# Patient Record
Sex: MALE | Race: WHITE | Employment: FULL TIME | URBAN - METROPOLITAN AREA
[De-identification: names, ages, dates, MRNs, and addresses within clinical notes are randomized per-mention and may not be internally consistent; named-entity substitution may affect disease eponyms.]

---

## 2020-08-10 ENCOUNTER — HOSPITAL ENCOUNTER (EMERGENCY)
Age: 55
Discharge: HOME | End: 2020-08-10
Attending: EMERGENCY MEDICINE
Payer: COMMERCIAL

## 2020-08-10 ENCOUNTER — APPOINTMENT (OUTPATIENT)
Dept: GENERAL RADIOLOGY | Age: 55
End: 2020-08-10
Payer: COMMERCIAL

## 2020-08-10 VITALS
OXYGEN SATURATION: 97 % | BODY MASS INDEX: 29.02 KG/M2 | HEART RATE: 81 BPM | TEMPERATURE: 99 F | HEIGHT: 72 IN | WEIGHT: 214.29 LBS | RESPIRATION RATE: 15 BRPM | DIASTOLIC BLOOD PRESSURE: 80 MMHG | SYSTOLIC BLOOD PRESSURE: 121 MMHG

## 2020-08-10 DIAGNOSIS — R53.81 MALAISE AND FATIGUE: ICD-10-CM

## 2020-08-10 DIAGNOSIS — R63.0 DECREASED APPETITE: ICD-10-CM

## 2020-08-10 DIAGNOSIS — R53.83 MALAISE AND FATIGUE: ICD-10-CM

## 2020-08-10 DIAGNOSIS — R50.9 FEVER, UNSPECIFIED FEVER CAUSE: Primary | ICD-10-CM

## 2020-08-10 LAB
ALBUMIN SERPL-MCNC: 3.6 G/DL (ref 3.5–5)
ALBUMIN/GLOB SERPL: 0.7 {RATIO} (ref 1.1–2.2)
ALP SERPL-CCNC: 65 U/L (ref 45–117)
ALT SERPL-CCNC: 55 U/L (ref 12–78)
ANION GAP SERPL CALC-SCNC: 4 MMOL/L (ref 5–15)
APPEARANCE UR: CLEAR
AST SERPL-CCNC: 27 U/L (ref 15–37)
BACTERIA URNS QL MICRO: NEGATIVE /HPF
BASOPHILS # BLD: 0 K/UL (ref 0–0.1)
BASOPHILS NFR BLD: 0 % (ref 0–1)
BILIRUB SERPL-MCNC: 0.8 MG/DL (ref 0.2–1)
BILIRUB UR QL: NEGATIVE
BUN SERPL-MCNC: 13 MG/DL (ref 6–20)
BUN/CREAT SERPL: 11 (ref 12–20)
CALCIUM SERPL-MCNC: 9.3 MG/DL (ref 8.5–10.1)
CHLORIDE SERPL-SCNC: 103 MMOL/L (ref 97–108)
CO2 SERPL-SCNC: 28 MMOL/L (ref 21–32)
COLOR UR: ABNORMAL
CREAT SERPL-MCNC: 1.22 MG/DL (ref 0.7–1.3)
DIFFERENTIAL METHOD BLD: ABNORMAL
EOSINOPHIL # BLD: 0 K/UL (ref 0–0.4)
EOSINOPHIL NFR BLD: 0 % (ref 0–7)
EPITH CASTS URNS QL MICRO: ABNORMAL /LPF
ERYTHROCYTE [DISTWIDTH] IN BLOOD BY AUTOMATED COUNT: 13.5 % (ref 11.5–14.5)
GLOBULIN SER CALC-MCNC: 5.1 G/DL (ref 2–4)
GLUCOSE SERPL-MCNC: 109 MG/DL (ref 65–100)
GLUCOSE UR STRIP.AUTO-MCNC: NEGATIVE MG/DL
HCT VFR BLD AUTO: 44.3 % (ref 36.6–50.3)
HGB BLD-MCNC: 13.9 G/DL (ref 12.1–17)
HGB UR QL STRIP: NEGATIVE
HYALINE CASTS URNS QL MICRO: ABNORMAL /LPF (ref 0–5)
IMM GRANULOCYTES # BLD AUTO: 0.1 K/UL (ref 0–0.04)
IMM GRANULOCYTES NFR BLD AUTO: 1 % (ref 0–0.5)
KETONES UR QL STRIP.AUTO: NEGATIVE MG/DL
LEUKOCYTE ESTERASE UR QL STRIP.AUTO: NEGATIVE
LYMPHOCYTES # BLD: 1.2 K/UL (ref 0.8–3.5)
LYMPHOCYTES NFR BLD: 12 % (ref 12–49)
MCH RBC QN AUTO: 26.3 PG (ref 26–34)
MCHC RBC AUTO-ENTMCNC: 31.4 G/DL (ref 30–36.5)
MCV RBC AUTO: 83.7 FL (ref 80–99)
MONOCYTES # BLD: 1.5 K/UL (ref 0–1)
MONOCYTES NFR BLD: 15 % (ref 5–13)
NEUTS SEG # BLD: 7.2 K/UL (ref 1.8–8)
NEUTS SEG NFR BLD: 72 % (ref 32–75)
NITRITE UR QL STRIP.AUTO: NEGATIVE
NRBC # BLD: 0 K/UL (ref 0–0.01)
NRBC BLD-RTO: 0 PER 100 WBC
PH UR STRIP: 6 [PH] (ref 5–8)
PLATELET # BLD AUTO: 312 K/UL (ref 150–400)
PMV BLD AUTO: 9.2 FL (ref 8.9–12.9)
POTASSIUM SERPL-SCNC: 4.3 MMOL/L (ref 3.5–5.1)
PROT SERPL-MCNC: 8.7 G/DL (ref 6.4–8.2)
PROT UR STRIP-MCNC: 30 MG/DL
RBC # BLD AUTO: 5.29 M/UL (ref 4.1–5.7)
RBC #/AREA URNS HPF: ABNORMAL /HPF (ref 0–5)
SODIUM SERPL-SCNC: 135 MMOL/L (ref 136–145)
SP GR UR REFRACTOMETRY: 1.02 (ref 1–1.03)
T4 FREE SERPL-MCNC: 1.3 NG/DL (ref 0.8–1.5)
TSH SERPL DL<=0.05 MIU/L-ACNC: 1.09 UIU/ML (ref 0.36–3.74)
UA: UC IF INDICATED,UAUC: ABNORMAL
UROBILINOGEN UR QL STRIP.AUTO: 0.2 EU/DL (ref 0.2–1)
WBC # BLD AUTO: 10 K/UL (ref 4.1–11.1)
WBC URNS QL MICRO: ABNORMAL /HPF (ref 0–4)

## 2020-08-10 PROCEDURE — 36415 COLL VENOUS BLD VENIPUNCTURE: CPT

## 2020-08-10 PROCEDURE — 84439 ASSAY OF FREE THYROXINE: CPT

## 2020-08-10 PROCEDURE — 96361 HYDRATE IV INFUSION ADD-ON: CPT

## 2020-08-10 PROCEDURE — 86757 RICKETTSIA ANTIBODY: CPT

## 2020-08-10 PROCEDURE — 99285 EMERGENCY DEPT VISIT HI MDM: CPT

## 2020-08-10 PROCEDURE — 81001 URINALYSIS AUTO W/SCOPE: CPT

## 2020-08-10 PROCEDURE — 71045 X-RAY EXAM CHEST 1 VIEW: CPT

## 2020-08-10 PROCEDURE — 84480 ASSAY TRIIODOTHYRONINE (T3): CPT

## 2020-08-10 PROCEDURE — 87635 SARS-COV-2 COVID-19 AMP PRB: CPT

## 2020-08-10 PROCEDURE — 85025 COMPLETE CBC W/AUTO DIFF WBC: CPT

## 2020-08-10 PROCEDURE — 84443 ASSAY THYROID STIM HORMONE: CPT

## 2020-08-10 PROCEDURE — 96374 THER/PROPH/DIAG INJ IV PUSH: CPT

## 2020-08-10 PROCEDURE — 80053 COMPREHEN METABOLIC PANEL: CPT

## 2020-08-10 PROCEDURE — 86618 LYME DISEASE ANTIBODY: CPT

## 2020-08-10 PROCEDURE — 74011250636 HC RX REV CODE- 250/636: Performed by: PHYSICIAN ASSISTANT

## 2020-08-10 RX ORDER — DOXYCYCLINE HYCLATE 100 MG
100 TABLET ORAL 2 TIMES DAILY
Qty: 10 TAB | Refills: 0 | Status: SHIPPED | OUTPATIENT
Start: 2020-08-10 | End: 2020-08-20

## 2020-08-10 RX ORDER — ONDANSETRON 4 MG/1
4 TABLET, ORALLY DISINTEGRATING ORAL
Qty: 15 TAB | Refills: 0 | Status: SHIPPED | OUTPATIENT
Start: 2020-08-10 | End: 2020-08-20

## 2020-08-10 RX ORDER — ONDANSETRON 2 MG/ML
4 INJECTION INTRAMUSCULAR; INTRAVENOUS
Status: COMPLETED | OUTPATIENT
Start: 2020-08-10 | End: 2020-08-10

## 2020-08-10 RX ADMIN — SODIUM CHLORIDE 1000 ML: 9 INJECTION, SOLUTION INTRAVENOUS at 12:57

## 2020-08-10 RX ADMIN — ONDANSETRON 4 MG: 2 INJECTION INTRAMUSCULAR; INTRAVENOUS at 12:57

## 2020-08-10 NOTE — ED PROVIDER NOTES
EMERGENCY DEPARTMENT HISTORY AND PHYSICAL EXAM      Date: 8/10/2020  Patient Name: Gisselle Loya    History of Presenting Illness     Chief Complaint   Patient presents with    Fever     Fever for a week on and off.  Decreased Appetite     Doesn't feel like eating. Still has taste though.  Neck Pain     2 to 3 days of neck pain. History Provided By: Patient and patient's wife    HPI: Gisselle Loya, 63 y.o. male presents to the Emergency Dept with his wife in attendance, c/o 1 week h/o fever/chills, malaise/fatigue and decreased appetite. Pt states last weekend he went to a cabin with a friend and \"got bad sunburn on Saturday and the next day I noticed chills\". The patient states Sunday, in spite of this he went for a very lengthy hike. They went home a little early Sunday and he noted Monday morning he was \"drenched in sweat\". He reports the fever/sweats/chills have persisted throughout the week. He followed up with his PCP at [facility] who felt the patient had a viral illness and encouraged use of Tylenol/Motrin but to return if he did not improve. The patient denied ill contacts. No other recent travel. He denied congestion, cough, shortness of breath or chest pain. He denied N/V/D but hasn't had any appetite and denied much po intake. He denied dysuria/hematuria. He reports normal urine output. He denied headache. His wife reports the patient has had neck pain for the last several days. He denied injury and reports he has not been sleeping well. No rash/lesion. He denied known insect sting/bite. He returned to his PCP today and was referred to the ED for further evaluation. His wife added Serrano paola said to make sure to check his thyroid\".         Chief Complaint: fevers, decreased appetite, malaise and fatigue since 8/2  Duration:7 Days  Timing:  Acute  Location: diffuse  Quality: Aching  Severity: Moderate  Modifying Factors: no ill contacts, no vomiting, had Tylenol at 0800  Associated Symptoms: denies any other associated signs or symptoms        There are no other complaints, changes, or physical findings at this time. PCP: Grace Baeza NP    Current Outpatient Medications   Medication Sig Dispense Refill    ondansetron (Zofran ODT) 4 mg disintegrating tablet Take 1 Tab by mouth every eight (8) hours as needed for Nausea or Vomiting for up to 15 doses. 15 Tab 0    doxycycline (VIBRA-TABS) 100 mg tablet Take 1 Tab by mouth two (2) times a day for 10 days. 10 Tab 0       Past History     Past Medical History:  History reviewed. No pertinent past medical history. Past Surgical History:  History reviewed. No pertinent surgical history. Family History:  History reviewed. No pertinent family history. Social History:  Social History     Tobacco Use    Smoking status: Not on file   Substance Use Topics    Alcohol use: Not on file    Drug use: Not on file       Allergies: Allergies   Allergen Reactions    Lidocaine Other (comments)     Passes out    Other Medication Nausea Only     Antibiotics make him nauseated - can't remember which ones         Review of Systems   Review of Systems   Constitutional: Positive for appetite change, chills, fatigue and fever. HENT: Negative for congestion, postnasal drip, rhinorrhea and sore throat. Eyes: Negative for photophobia and visual disturbance. Respiratory: Negative for cough and shortness of breath. Cardiovascular: Negative for chest pain and palpitations. Gastrointestinal: Negative for abdominal pain, constipation, diarrhea, nausea and vomiting. Endocrine: Negative for polydipsia, polyphagia and polyuria. Genitourinary: Negative for decreased urine volume, difficulty urinating, dysuria and hematuria. Musculoskeletal: Negative for myalgias, neck pain and neck stiffness. Skin: Negative for pallor, rash and wound.    Allergic/Immunologic: Negative for food allergies and immunocompromised state.   Neurological: Negative for dizziness and headaches. Hematological: Negative for adenopathy. Does not bruise/bleed easily. Psychiatric/Behavioral: Negative for agitation and confusion. All other systems reviewed and are negative. Physical Exam   Physical Exam  Vitals signs and nursing note reviewed. Constitutional:       General: He is not in acute distress. Appearance: Normal appearance. He is well-developed and normal weight. He is ill-appearing. He is not toxic-appearing or diaphoretic. HENT:      Head: Normocephalic and atraumatic. Right Ear: Tympanic membrane, ear canal and external ear normal. There is no impacted cerumen. Left Ear: Tympanic membrane, ear canal and external ear normal. There is no impacted cerumen. Nose: Nose normal. No congestion or rhinorrhea. Mouth/Throat:      Mouth: Mucous membranes are dry. Pharynx: No oropharyngeal exudate. Eyes:      General: No scleral icterus. Right eye: No discharge. Left eye: No discharge. Extraocular Movements: Extraocular movements intact. Conjunctiva/sclera: Conjunctivae normal.      Pupils: Pupils are equal, round, and reactive to light. Neck:      Musculoskeletal: Normal range of motion and neck supple. Muscular tenderness present. No neck rigidity. Thyroid: No thyromegaly. Vascular: No JVD. Trachea: No tracheal deviation. Comments: Mild paracervical tenderness, no midline cervical spinal tenderness, full A/P ROM without tenderness  Cardiovascular:      Rate and Rhythm: Normal rate and regular rhythm. Pulses: Normal pulses. Heart sounds: Normal heart sounds. Pulmonary:      Effort: Pulmonary effort is normal. No respiratory distress. Breath sounds: Normal breath sounds. No wheezing. Abdominal:      General: Abdomen is flat. Palpations: Abdomen is soft. Tenderness: There is no abdominal tenderness.  There is no right CVA tenderness, left CVA tenderness, guarding or rebound. Musculoskeletal: Normal range of motion. General: No tenderness. Right lower leg: No edema. Left lower leg: No edema. Lymphadenopathy:      Cervical: No cervical adenopathy. Skin:     General: Skin is warm and dry. Coloration: Skin is not pale. Findings: No rash. Neurological:      General: No focal deficit present. Mental Status: He is alert and oriented to person, place, and time. Sensory: No sensory deficit. Motor: No weakness or abnormal muscle tone. Coordination: Coordination normal.      Gait: Gait normal.   Psychiatric:         Mood and Affect: Mood normal.         Behavior: Behavior normal.         Judgment: Judgment normal.         Diagnostic Study Results     Labs -     Recent Results (from the past 12 hour(s))   CBC WITH AUTOMATED DIFF    Collection Time: 08/10/20 11:36 AM   Result Value Ref Range    WBC 10.0 4.1 - 11.1 K/uL    RBC 5.29 4. 10 - 5.70 M/uL    HGB 13.9 12.1 - 17.0 g/dL    HCT 44.3 36.6 - 50.3 %    MCV 83.7 80.0 - 99.0 FL    MCH 26.3 26.0 - 34.0 PG    MCHC 31.4 30.0 - 36.5 g/dL    RDW 13.5 11.5 - 14.5 %    PLATELET 220 625 - 827 K/uL    MPV 9.2 8.9 - 12.9 FL    NRBC 0.0 0  WBC    ABSOLUTE NRBC 0.00 0.00 - 0.01 K/uL    NEUTROPHILS 72 32 - 75 %    LYMPHOCYTES 12 12 - 49 %    MONOCYTES 15 (H) 5 - 13 %    EOSINOPHILS 0 0 - 7 %    BASOPHILS 0 0 - 1 %    IMMATURE GRANULOCYTES 1 (H) 0.0 - 0.5 %    ABS. NEUTROPHILS 7.2 1.8 - 8.0 K/UL    ABS. LYMPHOCYTES 1.2 0.8 - 3.5 K/UL    ABS. MONOCYTES 1.5 (H) 0.0 - 1.0 K/UL    ABS. EOSINOPHILS 0.0 0.0 - 0.4 K/UL    ABS. BASOPHILS 0.0 0.0 - 0.1 K/UL    ABS. IMM.  GRANS. 0.1 (H) 0.00 - 0.04 K/UL    DF AUTOMATED     METABOLIC PANEL, COMPREHENSIVE    Collection Time: 08/10/20 11:36 AM   Result Value Ref Range    Sodium 135 (L) 136 - 145 mmol/L    Potassium 4.3 3.5 - 5.1 mmol/L    Chloride 103 97 - 108 mmol/L    CO2 28 21 - 32 mmol/L    Anion gap 4 (L) 5 - 15 mmol/L Glucose 109 (H) 65 - 100 mg/dL    BUN 13 6 - 20 MG/DL    Creatinine 1.22 0.70 - 1.30 MG/DL    BUN/Creatinine ratio 11 (L) 12 - 20      GFR est AA >60 >60 ml/min/1.73m2    GFR est non-AA >60 >60 ml/min/1.73m2    Calcium 9.3 8.5 - 10.1 MG/DL    Bilirubin, total 0.8 0.2 - 1.0 MG/DL    ALT (SGPT) 55 12 - 78 U/L    AST (SGOT) 27 15 - 37 U/L    Alk. phosphatase 65 45 - 117 U/L    Protein, total 8.7 (H) 6.4 - 8.2 g/dL    Albumin 3.6 3.5 - 5.0 g/dL    Globulin 5.1 (H) 2.0 - 4.0 g/dL    A-G Ratio 0.7 (L) 1.1 - 2.2     URINALYSIS W/ REFLEX CULTURE    Collection Time: 08/10/20  1:43 PM    Specimen: Urine   Result Value Ref Range    Color YELLOW/STRAW      Appearance CLEAR CLEAR      Specific gravity 1.018 1.003 - 1.030      pH (UA) 6.0 5.0 - 8.0      Protein 30 (A) NEG mg/dL    Glucose Negative NEG mg/dL    Ketone Negative NEG mg/dL    Bilirubin Negative NEG      Blood Negative NEG      Urobilinogen 0.2 0.2 - 1.0 EU/dL    Nitrites Negative NEG      Leukocyte Esterase Negative NEG      WBC 0-4 0 - 4 /hpf    RBC 0-5 0 - 5 /hpf    Epithelial cells FEW FEW /lpf    Bacteria Negative NEG /hpf    UA:UC IF INDICATED CULTURE NOT INDICATED BY UA RESULT CNI      Hyaline cast 0-2 0 - 5 /lpf   TSH 3RD GENERATION    Collection Time: 08/10/20  1:43 PM   Result Value Ref Range    TSH 1.09 0.36 - 3.74 uIU/mL   T4, FREE    Collection Time: 08/10/20  1:43 PM   Result Value Ref Range    T4, Free 1.3 0.8 - 1.5 NG/DL   SARS-COV-2    Collection Time: 08/10/20  2:57 PM   Result Value Ref Range    Specimen source Nasopharyngeal      Specimen source Nasopharyngeal      Specimen type NP Swab      Health status Symptomatic Testing      COVID-19 PENDING        Radiologic Studies -   XR CHEST SNGL V   Final Result    impression: Negative. CXR Results  (Last 48 hours)               08/10/20 1232  XR CHEST SNGL V Final result    Impression:   impression: Negative. Narrative:  Clinical indication: Fever.        Portable AP upright view of the chest obtained, no prior. The  heart size is normal. There is no acute infiltrate. Medical Decision Making   I am the first provider for this patient. I reviewed the vital signs, available nursing notes, past medical history, past surgical history, family history and social history. Vital Signs-Reviewed the patient's vital signs. Patient Vitals for the past 12 hrs:   Temp Pulse Resp BP SpO2   08/10/20 1500 99 °F (37.2 °C) 81 15 121/80 97 %   08/10/20 1400  73 27 117/71 98 %   08/10/20 1300  81 25 130/75 97 %   08/10/20 1230  80 25 124/76 98 %   08/10/20 1125 99.1 °F (37.3 °C) 86 18 131/81 99 %         Records Reviewed: Nursing Notes, Old Medical Records, Previous Radiology Studies and Previous Laboratory Studies    Provider Notes (Medical Decision Making):   Viral illness, electrolyte dysfunction, dehydration, UTI, PNA, thyroid d/o, tick bite, COVID    ED Course:   Initial assessment performed. The patients presenting problems have been discussed, and they are in agreement with the care plan formulated and outlined with them. I have encouraged them to ask questions as they arise throughout their visit. Case d/w Dr. Iglesias who recommended addition of Lyme Titer and Yash Mtn Spotted Fever as well as discharge home with Doxycycline. The evaluation, management, and disposition decisions of this patient have been made in the context of the current and rapidly developing COVID-19 pandemic. In my clinical judgment, the balance of clinical factors dictate expedited evaluation and discharge from the ED. I have carefully considered the risk and benefits of prolonged ED workups and/or hospitalization vs their risk of acquiring or transmitting COVID-19. I have made reasonable efforts to conserve healthcare resources and defer to safe outpatient alternatives when feasible. I have also discussed the importance of social distancing and proper hygiene to the patient.   Based on an appropriate medical screening exam, there is currently no evidence of an emergency medical condition in the patient, and he is clinically safe for discharge. This was a collective decision made with the patient and/or any available family/caretakers. They expressed understanding and agreement with the above. Monette Goldberg, [place]       PLAN:  1. Discharge Medication List as of 8/10/2020  2:35 PM        2. Follow-up Information     Follow up With Specialties Details Why Contact Info    Chetan Galvin, MARTIR Nurse Practitioner   [address] [place] [address]  [phone]      \[facility] [place] [facility]\"" EMERGENCY DEPT Emergency Medicine  If symptoms worsen [address] [place] [address] Drive  [address] [facility]  [phone]        Return to ED if worse     Diagnosis     Clinical Impression:   1. Fever, unspecified fever cause    2. Decreased appetite    3.  Malaise and fatigue

## 2020-08-10 NOTE — DISCHARGE INSTRUCTIONS
Rest, push fluids, alternate Tylenol and Motrin for fever, and follow up with PCP for recheck. Return to the emergency department for any worsening fever, cough, chest pain, shortness of breath, decreased oral intake, or decreased urine output. Patient Education   Learning About Coronavirus [phone])  Coronavirus [phone]): Overview  What is coronavirus (EVGSN-54)? The coronavirus disease (COVID-19) is caused by a virus. It is an illness that was first found in [place], [place], in December 2019. It has since spread worldwide. The virus can cause fever, cough, and trouble breathing. In severe cases, it can cause pneumonia and make it hard to breathe without help. It can cause death. Coronaviruses are a large group of viruses. They cause the common cold. They also cause more serious illnesses like Middle East respiratory syndrome (MERS) and severe acute respiratory syndrome (SARS). COVID-19 is caused by a novel coronavirus. That means it's a new type that has not been seen in people before. This virus spreads person-to-person through droplets from coughing and sneezing. It can also spread when you are close to someone who is infected. And it can spread when you touch something that has the virus on it, such as a doorknob or a tabletop. What can you do to protect yourself from coronavirus (COVID-19)? The best way to protect yourself from getting sick is to:  · Avoid areas where there is an outbreak. · Avoid contact with people who may be infected. · Wash your hands often with soap or alcohol-based hand sanitizers. · Avoid crowds and try to stay at least 6 feet away from other people. · Wash your hands often, especially after you cough or sneeze. Use soap and water, and scrub for at least 20 seconds. If soap and water aren't available, use an alcohol-based hand . · Avoid touching your mouth, nose, and eyes. What can you do to avoid spreading the virus to others?   To help avoid spreading the virus to others:  · Cover your mouth with a tissue when you cough or sneeze. Then throw the tissue in the trash. · Use a disinfectant to clean things that you touch often. · Stay home if you are sick or have been exposed to the virus. Don't go to school, work, or public areas. And don't use public transportation. · If you are sick:  ? Leave your home only if you need to get medical care. But call the doctor's office first so they know you're coming. And wear a face mask, if you have one.  ? If you have a face mask, wear it whenever you're around other people. It can help stop the spread of the virus when you cough or sneeze. ? Clean and disinfect your home every day. Use household  and disinfectant wipes or sprays. Take special care to clean things that you grab with your hands. These include doorknobs, remote controls, phones, and handles on your refrigerator and microwave. And don't forget countertops, tabletops, bathrooms, and computer keyboards. When to call for help  Call 911 anytime you think you may need emergency care. For example, call if:  · You have severe trouble breathing. (You can't talk at all.)  · You have constant chest pain or pressure. · You are severely dizzy or lightheaded. · You are confused or can't think clearly. · Your face and lips have a blue color. · You pass out (lose consciousness) or are very hard to wake up. Call your doctor now if you develop symptoms such as:  · Shortness of breath. · Fever. · Cough. If you need to get care, call ahead to the doctor's office for instructions before you go. Make sure you wear a face mask, if you have one, to prevent exposing other people to the virus. Where can you get the latest information? The following health organizations are tracking and studying this virus. Their websites contain the most up-to-date information. Carmel Cedillo also learn what to do if you think you may have been exposed to the virus. · [place]  Centers for Disease Control and Prevention (CDC): The CDC provides updated news about the disease and travel advice. The website also tells you how to prevent the spread of infection. www.cdc.gov  · World Health Organization [facility]): WHO offers information about the virus outbreaks. WHO also has travel advice. www.who.int  Current as of: April 1, 2020               Content Version: 12.4  © 1482-5585 Healthwise, Incorporated. Care instructions adapted under license by your healthcare professional. If you have questions about a medical condition or this instruction, always ask your healthcare professional. [address] any warranty or liability for your use of this information.

## 2020-08-10 NOTE — LETTER
[address] 
[facility] EMERGENCY DEPT 
[address] [facility] [place] [id number] [phone] Work/School Note Date: 8/10/2020 To Whom It May concern: 
 
 
Jaret Ramsey was seen and treated today in the emergency room. In light of the current COVID-19 pandemic, please excuse your employee from work under the circumstance below: 
 
1) If patient was exposed but without symptoms, he/she should self-isolate at home for 14 days from day of exposure. 2) If patient has symptoms concerning for COVID-19, such as fever, cough, shortness of breath, regardless if patient received testing or not, patient should self-isolate at home until 3 days after symptoms have resolved AND 7 days after symptoms first started, whichever is later. Thank you. Sincerely, Teodoro Bo

## 2020-08-10 NOTE — ED NOTES
Discharge instructions provided to patient and family. Verbalized understanding. Alert and oriented. IV lock removed. Offered w/c but pt declined. Ambulated with steady gait out of ED.

## 2020-08-11 ENCOUNTER — PATIENT OUTREACH (OUTPATIENT)
Dept: CASE MANAGEMENT | Age: 55
End: 2020-08-11

## 2020-08-11 LAB — R RICKETTSI IGM SER-ACNC: 0.22 INDEX (ref 0–0.89)

## 2020-08-11 NOTE — PROGRESS NOTES
Patient contacted regarding COVID-19  risk. Discussed COVID-19 related testing which was available at this time. Test results were pending. Patient informed of results, if available? Patient informed test result are pending     Care Transition Nurse/ Ambulatory Care Manager/ LPN Care Coordinator contacted the patient by telephone to perform post discharge assessment. Verified name and  with patient as identifiers. Provided introduction to self, and explanation of the CTN/ACM/LPN role, and reason for call due to risk factors for infection and/or exposure to COVID-19. Symptoms reviewed with patient who verbalized the following symptoms: no worsening symptoms. Due to no new or worsening symptoms encounter was not routed to provider for escalation. Discussed follow-up appointments. If no appointment was previously scheduled, appointment scheduling offered: no awaiting test result  [address] follow up appointment(s): No future appointments. Non-[facility] follow up appointment(s): n/a      Advance Care Planning:   Does patient have an Advance Directive: not on file     Patient has following risk factors of: no known risk factors. CTN/ACM/LPN reviewed discharge instructions, medical action plan and red flags such as increased shortness of breath, increasing fever and signs of decompensation with patient who verbalized understanding. Discussed exposure protocols and quarantine with CDC Guidelines What to do if you are sick with coronavirus disease .  Patient was given an opportunity for questions and concerns. The patient agrees to contact the [facility] exposure line [phone], [facility] [address]  ([phone]) and PCP office for questions related to their healthcare. CTN/ACM provided contact information for future needs. Reviewed and educated patient on any new and changed medications related to discharge diagnosis.     Patient/family/caregiver given information for "[employer] Loop and agrees to enroll yes  Patient's preferred [email or web address]  Patient's preferred phone number: [phone]  Based on Loop alert triggers, patient will be contacted by nurse care manager for worsening symptoms. Pt will be further monitored by COVID Loop Team based on severity of symptoms and risk factors.

## 2020-08-12 LAB
B BURGDOR IGG+IGM SER-ACNC: <0.91 ISR (ref 0–0.9)
T3 SERPL-MCNC: 83 NG/DL (ref 71–180)

## 2020-08-13 LAB
COVID-19, XGCOVT: NOT DETECTED
HEALTH STATUS, XMCV2T: NORMAL
SOURCE, COVRS: NORMAL
SPECIMEN SOURCE, FCOV2M: NORMAL
SPECIMEN TYPE, XMCV1T: NORMAL

## 2020-08-20 ENCOUNTER — HOSPITAL ENCOUNTER (EMERGENCY)
Age: 55
Discharge: HOME | End: 2020-08-20
Attending: EMERGENCY MEDICINE
Payer: COMMERCIAL

## 2020-08-20 ENCOUNTER — APPOINTMENT (OUTPATIENT)
Dept: GENERAL RADIOLOGY | Age: 55
End: 2020-08-20
Attending: EMERGENCY MEDICINE
Payer: COMMERCIAL

## 2020-08-20 VITALS
WEIGHT: 208.78 LBS | HEART RATE: 80 BPM | HEIGHT: 73 IN | RESPIRATION RATE: 16 BRPM | DIASTOLIC BLOOD PRESSURE: 77 MMHG | SYSTOLIC BLOOD PRESSURE: 119 MMHG | OXYGEN SATURATION: 100 % | BODY MASS INDEX: 27.67 KG/M2 | TEMPERATURE: 98.3 F

## 2020-08-20 DIAGNOSIS — R50.9 FEVER, UNSPECIFIED FEVER CAUSE: Primary | ICD-10-CM

## 2020-08-20 DIAGNOSIS — R53.83 FATIGUE, UNSPECIFIED TYPE: ICD-10-CM

## 2020-08-20 DIAGNOSIS — R07.89 ATYPICAL CHEST PAIN: ICD-10-CM

## 2020-08-20 LAB
ALBUMIN SERPL-MCNC: 3.4 G/DL (ref 3.5–5)
ALBUMIN/GLOB SERPL: 0.6 {RATIO} (ref 1.1–2.2)
ALP SERPL-CCNC: 77 U/L (ref 45–117)
ALT SERPL-CCNC: 63 U/L (ref 12–78)
ANION GAP SERPL CALC-SCNC: 6 MMOL/L (ref 5–15)
APPEARANCE UR: CLEAR
AST SERPL-CCNC: 20 U/L (ref 15–37)
BACTERIA URNS QL MICRO: NEGATIVE /HPF
BASOPHILS # BLD: 0 K/UL (ref 0–0.1)
BASOPHILS NFR BLD: 0 % (ref 0–1)
BILIRUB SERPL-MCNC: 0.5 MG/DL (ref 0.2–1)
BILIRUB UR QL: NEGATIVE
BNP SERPL-MCNC: 145 PG/ML
BUN SERPL-MCNC: 18 MG/DL (ref 6–20)
BUN/CREAT SERPL: 15 (ref 12–20)
CALCIUM SERPL-MCNC: 9.8 MG/DL (ref 8.5–10.1)
CHLORIDE SERPL-SCNC: 105 MMOL/L (ref 97–108)
CK SERPL-CCNC: 51 U/L (ref 39–308)
CO2 SERPL-SCNC: 25 MMOL/L (ref 21–32)
COLOR UR: NORMAL
CREAT SERPL-MCNC: 1.17 MG/DL (ref 0.7–1.3)
DIFFERENTIAL METHOD BLD: ABNORMAL
EOSINOPHIL # BLD: 0.1 K/UL (ref 0–0.4)
EOSINOPHIL NFR BLD: 1 % (ref 0–7)
EPITH CASTS URNS QL MICRO: NORMAL /LPF
ERYTHROCYTE [DISTWIDTH] IN BLOOD BY AUTOMATED COUNT: 13.2 % (ref 11.5–14.5)
GLOBULIN SER CALC-MCNC: 5.3 G/DL (ref 2–4)
GLUCOSE SERPL-MCNC: 102 MG/DL (ref 65–100)
GLUCOSE UR STRIP.AUTO-MCNC: NEGATIVE MG/DL
HCT VFR BLD AUTO: 39.6 % (ref 36.6–50.3)
HGB BLD-MCNC: 12.5 G/DL (ref 12.1–17)
HGB UR QL STRIP: NEGATIVE
HYALINE CASTS URNS QL MICRO: NORMAL /LPF (ref 0–5)
IMM GRANULOCYTES # BLD AUTO: 0.1 K/UL (ref 0–0.04)
IMM GRANULOCYTES NFR BLD AUTO: 1 % (ref 0–0.5)
KETONES UR QL STRIP.AUTO: NEGATIVE MG/DL
LACTATE BLD-SCNC: 0.58 MMOL/L (ref 0.4–2)
LEUKOCYTE ESTERASE UR QL STRIP.AUTO: NEGATIVE
LIPASE SERPL-CCNC: 294 U/L (ref 73–393)
LYMPHOCYTES # BLD: 1.4 K/UL (ref 0.8–3.5)
LYMPHOCYTES NFR BLD: 13 % (ref 12–49)
MAGNESIUM SERPL-MCNC: 2.4 MG/DL (ref 1.6–2.4)
MCH RBC QN AUTO: 26.3 PG (ref 26–34)
MCHC RBC AUTO-ENTMCNC: 31.6 G/DL (ref 30–36.5)
MCV RBC AUTO: 83.2 FL (ref 80–99)
MONOCYTES # BLD: 1.1 K/UL (ref 0–1)
MONOCYTES NFR BLD: 11 % (ref 5–13)
NEUTS SEG # BLD: 8 K/UL (ref 1.8–8)
NEUTS SEG NFR BLD: 74 % (ref 32–75)
NITRITE UR QL STRIP.AUTO: NEGATIVE
NRBC # BLD: 0 K/UL (ref 0–0.01)
NRBC BLD-RTO: 0 PER 100 WBC
PH UR STRIP: 5.5 [PH] (ref 5–8)
PLATELET # BLD AUTO: 520 K/UL (ref 150–400)
PMV BLD AUTO: 8.7 FL (ref 8.9–12.9)
POTASSIUM SERPL-SCNC: 4.7 MMOL/L (ref 3.5–5.1)
PROT SERPL-MCNC: 8.7 G/DL (ref 6.4–8.2)
PROT UR STRIP-MCNC: NEGATIVE MG/DL
RBC # BLD AUTO: 4.76 M/UL (ref 4.1–5.7)
RBC #/AREA URNS HPF: NORMAL /HPF (ref 0–5)
SODIUM SERPL-SCNC: 136 MMOL/L (ref 136–145)
SP GR UR REFRACTOMETRY: 1.02 (ref 1–1.03)
TROPONIN I SERPL-MCNC: <0.05 NG/ML
UA: UC IF INDICATED,UAUC: NORMAL
UROBILINOGEN UR QL STRIP.AUTO: 0.2 EU/DL (ref 0.2–1)
WBC # BLD AUTO: 10.7 K/UL (ref 4.1–11.1)
WBC URNS QL MICRO: NORMAL /HPF (ref 0–4)

## 2020-08-20 PROCEDURE — 83690 ASSAY OF LIPASE: CPT

## 2020-08-20 PROCEDURE — 83880 ASSAY OF NATRIURETIC PEPTIDE: CPT

## 2020-08-20 PROCEDURE — 84484 ASSAY OF TROPONIN QUANT: CPT

## 2020-08-20 PROCEDURE — 96374 THER/PROPH/DIAG INJ IV PUSH: CPT

## 2020-08-20 PROCEDURE — 83735 ASSAY OF MAGNESIUM: CPT

## 2020-08-20 PROCEDURE — 80053 COMPREHEN METABOLIC PANEL: CPT

## 2020-08-20 PROCEDURE — 86788 WEST NILE VIRUS AB IGM: CPT

## 2020-08-20 PROCEDURE — 74011000250 HC RX REV CODE- 250: Performed by: EMERGENCY MEDICINE

## 2020-08-20 PROCEDURE — 36415 COLL VENOUS BLD VENIPUNCTURE: CPT

## 2020-08-20 PROCEDURE — 85025 COMPLETE CBC W/AUTO DIFF WBC: CPT

## 2020-08-20 PROCEDURE — 74011250636 HC RX REV CODE- 250/636: Performed by: EMERGENCY MEDICINE

## 2020-08-20 PROCEDURE — 81001 URINALYSIS AUTO W/SCOPE: CPT

## 2020-08-20 PROCEDURE — 99284 EMERGENCY DEPT VISIT MOD MDM: CPT

## 2020-08-20 PROCEDURE — 83605 ASSAY OF LACTIC ACID: CPT

## 2020-08-20 PROCEDURE — 71045 X-RAY EXAM CHEST 1 VIEW: CPT

## 2020-08-20 PROCEDURE — 87040 BLOOD CULTURE FOR BACTERIA: CPT

## 2020-08-20 PROCEDURE — 82550 ASSAY OF CK (CPK): CPT

## 2020-08-20 PROCEDURE — 93005 ELECTROCARDIOGRAM TRACING: CPT

## 2020-08-20 RX ADMIN — FAMOTIDINE 20 MG: 10 INJECTION, SOLUTION INTRAVENOUS at 13:51

## 2020-08-20 RX ADMIN — SODIUM CHLORIDE 1000 ML: 900 INJECTION, SOLUTION INTRAVENOUS at 13:51

## 2020-08-20 NOTE — ED NOTES
MD at the bedside evaluating PT     Pt reports on going fevers for the past month with worsening symptoms at night. Pt reports chest discomfort and denies SOB. Pt reported stop taking tylenol because it did not help relive his symptoms. 1420 Pt resting on stretcher in POC with call bell in reach and family at bedside. Pt remains on monitor x  2 VSS at this time.

## 2020-08-20 NOTE — DISCHARGE INSTRUCTIONS
Patient Education        Learning About Fever  What is a fever? A fever is a high body temperature. It's one way your body fights being sick. A fever shows that the body is responding to infection or other illnesses, both minor and severe. A fever is a symptom, not an illness by itself. A fever can be a sign that you are ill, but most fevers are not caused by a serious problem. You may have a fever with a minor illness, such as a cold. But sometimes a very serious infection may cause little or no fever. It is important to look at other symptoms, other conditions you have, and how you feel in general. In children, notice how they act and see what symptoms they complain of. What is a normal body temperature? A normal body temperature is about 98. 6ºF. Some people have a normal temperature that is a little higher or a little lower than this. Your temperature may be a little lower in the morning than it is later in the day. It may go up during hot weather or when you exercise, wear heavy clothes, or take a hot bath. Your temperature may also be different depending on how you take it. A temperature taken in the mouth (oral) or under the arm may be a little lower than your core temperature (rectal). What is a fever temperature? A core temperature of 100.4°F or above is considered a fever. What can cause a fever? A fever may be caused by:  · Infections. This is the most common cause of a fever. Examples of infections that can cause a fever include the flu, a kidney infection, or pneumonia. · Some medicines. · Severe trauma or injury, such as a heart attack, stroke, heatstroke, or burns. · Other medical conditions, such as arthritis and some cancers. How can you treat a fever at home? · Ask your doctor if you can take an over-the-counter pain medicine, such as acetaminophen (Tylenol), ibuprofen (Advil, Motrin), or naproxen (Aleve). Be safe with medicines.  Read and follow all instructions on the label.  · To prevent dehydration, drink plenty of fluids. Choose water and other caffeine-free clear liquids until you feel better. If you have kidney, heart, or liver disease and have to limit fluids, talk with your doctor before you increase the amount of fluids you drink. Follow-up care is a key part of your treatment and safety. Be sure to make and go to all appointments, and call your doctor if you are having problems. It's also a good idea to know your test results and keep a list of the medicines you take. Where can you learn more? Go to http://annalise-zelalem.info/  Enter G732 in the search box to learn more about \"Learning About Fever. \"  Current as of: June 26, 2019               Content Version: 12.5  © 0670-7117 Healthwise, Incorporated. Care instructions adapted under license by [employer] (which disclaims liability or warranty for this information). If you have questions about a medical condition or this instruction, always ask your healthcare professional. [address] any warranty or liability for your use of this information.

## 2020-08-20 NOTE — ED PROVIDER NOTES
EMERGENCY DEPARTMENT HISTORY AND PHYSICAL EXAM      Date: 8/20/2020  Patient Name: Raquel Kraft    History of Presenting Illness     Chief Complaint   Patient presents with    Fever     pt has had fever since 08/02. These fevers spike at night. His temperature yesterday was normal all day until nighttime. previous viral work up was negative    Fatigue    Chest Pain     pt has midsternal chest pressure and burping since this started. History Provided By: Patient, Patient's Wife and Prior ER visit    HPI: Raquel Kraft, 63 y.o. male presents to the ED with cc of fever, fatigue and chest discomfort. Patient symptoms started on August 2. [place] and had [place]. Since then, he has had fevers of up to 103.5. The fevers have started to diminish, however he had a temperature of 102 last night. He has not been taking Tylenol recently. He was seen here in the emergency department on August 10. At that time, labs were unremarkable, including CBC thyroid panel, Lyme, R Rickettsia I, and SARS-CoV-2. He has a chest pressure which feels like a burping and is worse when he eats. That is located in the midsternal region. Does not radiate to the neck, back, jaw or arms. He has generalized fatigue. He denies headache, neck pain, dysuria, diarrhea, abdominal pain. There are no other complaints, changes, or physical findings at this time. PCP: Juana Carbajal NP    No current facility-administered medications on file prior to encounter. Current Outpatient Medications on File Prior to Encounter   Medication Sig Dispense Refill    [DISCONTINUED] ondansetron (Zofran ODT) 4 mg disintegrating tablet Take 1 Tab by mouth every eight (8) hours as needed for Nausea or Vomiting for up to 15 doses. 15 Tab 0    [DISCONTINUED] doxycycline (VIBRA-TABS) 100 mg tablet Take 1 Tab by mouth two (2) times a day for 10 days.  10 Tab 0       Past History     Past Medical History:  History reviewed. No pertinent past medical history. Past Surgical History:  History reviewed. No pertinent surgical history. Family History:  History reviewed. No pertinent family history. Social History:  Social History     Tobacco Use    Smoking status: Never Smoker    Smokeless tobacco: Never Used   Substance Use Topics    Alcohol use: Yes     Alcohol/week: 1.0 standard drinks     Types: 1 Cans of beer per week     Comment: on occasion     Drug use: Never       Allergies: Allergies   Allergen Reactions    Lidocaine Other (comments)     Passes out    Other Medication Nausea Only     Antibiotics make him nauseated - can't remember which ones         Review of Systems   Review of Systems   Constitutional: Positive for fatigue and fever. HENT: Negative for congestion. Eyes: Negative. Respiratory: Negative for cough and shortness of breath. Cardiovascular: Positive for chest pain. Gastrointestinal: Negative for abdominal pain. Endocrine: Negative for heat intolerance. Genitourinary: Negative for dysuria. Musculoskeletal: Negative for back pain. Skin: Negative for rash. Allergic/Immunologic: Negative for immunocompromised state. Neurological: Negative for headaches. Hematological: Does not bruise/bleed easily. Psychiatric/Behavioral: Negative. All other systems reviewed and are negative. Physical Exam   Physical Exam  Vitals signs and nursing note reviewed. Constitutional:       General: He is not in acute distress. Appearance: He is well-developed. HENT:      Head: Normocephalic and atraumatic. Neck:      Musculoskeletal: Normal range of motion and neck supple. Cardiovascular:      Rate and Rhythm: Normal rate and regular rhythm. Heart sounds: Normal heart sounds. Pulmonary:      Effort: Pulmonary effort is normal.      Breath sounds: Normal breath sounds.    Abdominal:      General: Bowel sounds are normal.      Palpations: Abdomen is soft. Musculoskeletal: Normal range of motion. Skin:     General: Skin is warm and dry. Neurological:      General: No focal deficit present. Mental Status: He is alert and oriented to person, place, and time. Coordination: Coordination normal.         Diagnostic Study Results     Labs -     Recent Results (from the past 12 hour(s))   EKG, 12 LEAD, INITIAL    Collection Time: 08/20/20 12:19 PM   Result Value Ref Range    Ventricular Rate 81 BPM    Atrial Rate 81 BPM    P-R Interval 162 ms    QRS Duration 86 ms    Q-T Interval 396 ms    QTC Calculation (Bezet) 460 ms    Calculated P Axis 27 degrees    Calculated R Axis 30 degrees    Calculated T Axis 24 degrees    Diagnosis       ** Poor data quality, interpretation may be adversely affected  Normal sinus rhythm  Normal ECG  No previous ECGs available     CBC WITH AUTOMATED DIFF    Collection Time: 08/20/20 12:40 PM   Result Value Ref Range    WBC 10.7 4.1 - 11.1 K/uL    RBC 4.76 4.10 - 5.70 M/uL    HGB 12.5 12.1 - 17.0 g/dL    HCT 39.6 36.6 - 50.3 %    MCV 83.2 80.0 - 99.0 FL    MCH 26.3 26.0 - 34.0 PG    MCHC 31.6 30.0 - 36.5 g/dL    RDW 13.2 11.5 - 14.5 %    PLATELET 991 (H) 761 - 400 K/uL    MPV 8.7 (L) 8.9 - 12.9 FL    NRBC 0.0 0  WBC    ABSOLUTE NRBC 0.00 0.00 - 0.01 K/uL    NEUTROPHILS 74 32 - 75 %    LYMPHOCYTES 13 12 - 49 %    MONOCYTES 11 5 - 13 %    EOSINOPHILS 1 0 - 7 %    BASOPHILS 0 0 - 1 %    IMMATURE GRANULOCYTES 1 (H) 0.0 - 0.5 %    ABS. NEUTROPHILS 8.0 1.8 - 8.0 K/UL    ABS. LYMPHOCYTES 1.4 0.8 - 3.5 K/UL    ABS. MONOCYTES 1.1 (H) 0.0 - 1.0 K/UL    ABS. EOSINOPHILS 0.1 0.0 - 0.4 K/UL    ABS. BASOPHILS 0.0 0.0 - 0.1 K/UL    ABS. IMM.  GRANS. 0.1 (H) 0.00 - 0.04 K/UL    DF AUTOMATED     METABOLIC PANEL, COMPREHENSIVE    Collection Time: 08/20/20 12:40 PM   Result Value Ref Range    Sodium 136 136 - 145 mmol/L    Potassium 4.7 3.5 - 5.1 mmol/L    Chloride 105 97 - 108 mmol/L    CO2 25 21 - 32 mmol/L    Anion gap 6 5 - 15 mmol/L    Glucose 102 (H) 65 - 100 mg/dL    BUN 18 6 - 20 MG/DL    Creatinine 1.17 0.70 - 1.30 MG/DL    BUN/Creatinine ratio 15 12 - 20      GFR est AA >60 >60 ml/min/1.73m2    GFR est non-AA >60 >60 ml/min/1.73m2    Calcium 9.8 8.5 - 10.1 MG/DL    Bilirubin, total 0.5 0.2 - 1.0 MG/DL    ALT (SGPT) 63 12 - 78 U/L    AST (SGOT) 20 15 - 37 U/L    Alk. phosphatase 77 45 - 117 U/L    Protein, total 8.7 (H) 6.4 - 8.2 g/dL    Albumin 3.4 (L) 3.5 - 5.0 g/dL    Globulin 5.3 (H) 2.0 - 4.0 g/dL    A-G Ratio 0.6 (L) 1.1 - 2.2     POC LACTIC ACID    Collection Time: 08/20/20  1:53 PM   Result Value Ref Range    Lactic Acid (POC) 0.58 0.40 - 2.00 mmol/L       Radiologic Studies -   XR CHEST PORT   Final Result   IMPRESSION: No acute findings. CT Results  (Last 48 hours)    None        CXR Results  (Last 48 hours)               08/20/20 1357  XR CHEST PORT Final result    Impression:  IMPRESSION: No acute findings. Narrative:  EXAM: XR CHEST PORT       INDICATION: pain       COMPARISON: 8/10/2020       FINDINGS: A portable AP radiograph of the chest was obtained at 1346 hours. There is no pneumothorax or pleural effusion. The lungs are clear. Cardiac,   mediastinal and hilar contours are normal.  The bones and soft tissues are   grossly within normal limits. Medical Decision Making   I am the first provider for this patient. I reviewed the vital signs, available nursing notes, past medical history, past surgical history, family history and social history. Vital Signs-Reviewed the patient's vital signs. Patient Vitals for the past 12 hrs:   Temp Pulse Resp BP SpO2   08/20/20 1329     100 %   08/20/20 1216 98.3 °F (36.8 °C) 80 16 119/77 100 %       EKG interpretation: (Preliminary)  Rhythm: normal sinus rhythm; and regular .  Rate (approx.): 81; Axis: normal; VT interval: normal; QRS interval: normal ; ST/T wave: Normal; Other findings: normal.    Records Reviewed: Nursing Notes, Old Medical Records, Previous electrocardiograms, Previous Radiology Studies and Previous Laboratory Studies    Provider Notes (Medical Decision Making):   Viral syndrome, CAD, dehydration, electrolyte abnormality, West Nile, anemia,    ED Course:   Initial assessment performed. The patients presenting problems have been discussed, and they are in agreement with the care plan formulated and outlined with them. I have encouraged them to ask questions as they arise throughout their visit. Progress note: The patient is feeling better. His results were reviewed. He is advised to follow-up and return to ER if worse               Critical Care Time:   0    Disposition:  home    DISCHARGE PLAN:  1. There are no discharge medications for this patient. 2.   Follow-up Information     Follow up With Specialties Details Why Contact Info    Tri Galvin NP Nurse Practitioner  As needed [address] [place] [address]  [phone]      Lora Goodwin MD Infectious Disease, Internal Medicine Call in 1 day  [address] [address]. [phone]      \[facility] [place] [facility]\"" EMERGENCY DEPT Emergency Medicine  If symptoms worsen [address]  [address] [facility]  [phone]        3. Return to ED if worse     Diagnosis     Clinical Impression:   1. Fever, unspecified fever cause    2. Atypical chest pain    3. Fatigue, unspecified type        Attestations:    Demetri Gregg MD    Please note that this dictation was completed with [employer]ev[employer], the computer voice recognition software. Quite often unanticipated grammatical, syntax, homophones, and other interpretive errors are inadvertently transcribed by the computer software. Please disregard these errors. Please excuse any errors that have escaped final proofreading. Thank you.

## 2020-08-21 ENCOUNTER — PATIENT OUTREACH (OUTPATIENT)
Dept: CASE MANAGEMENT | Age: 55
End: 2020-08-21

## 2020-08-21 NOTE — PROGRESS NOTES
Patient contacted regarding COVID-19  risk. Discussed COVID-19 related testing which was not done at this time. Test results were not done. Patient informed of results, if available? Covid test not done on this visit, was completed on 8/10/20-negative results. Care Transition Nurse/ Ambulatory Care Manager/ LPN Care Coordinator contacted the patient by telephone to perform post discharge assessment. Verified name and  with patient as identifiers. Provided introduction to self, and explanation of the CTN/ACM/LPN role, and reason for call due to risk factors for infection and/or exposure to COVID-19. Symptoms reviewed with patient who verbalized the following symptoms: fever, fatigue, pain or aching joints, nausea and chest pain. Due to no new or worsening symptoms encounter was not routed to provider for escalation. Discussed follow-up appointments. If no appointment was previously scheduled, appointment scheduling offered: no  [facility] follow up appointment(s): No future appointments. Non-[facility] follow up appointment(s): none scheduled at this time      Advance Care Planning:   Does patient have an Advance Directive: spouse is medical agent     Patient has following risk factors of: none reported at this time. CTN/ACM/LPN reviewed discharge instructions, medical action plan and red flags such as increased shortness of breath, increasing fever and signs of decompensation with patient who verbalized understanding. Discussed exposure protocols and quarantine with CDC Guidelines What to do if you are sick with coronavirus disease .  Patient was given an opportunity for questions and concerns. The patient agrees to contact the [facility] exposure line [phone], [facility] department [facility]  ([phone]) and PCP office for questions related to their healthcare. CTN/ACM provided contact information for future needs. Patient declined 800#s-stated he already has contact numbers. Reviewed and educated patient on any new and changed medications related to discharge diagnosis. Patient/family/caregiver given information for [employer] and agrees to enroll no  Patient's preferred e-mail:  n/a  Patient's preferred phone number: [phone]  Patient already enrolled in LOOP  Based on Loop alert triggers, patient will be contacted by nurse care manager for worsening symptoms. Pt will be further monitored by COVID Loop Team based on severity of symptoms and risk factors.  DMB

## 2020-08-22 LAB
ATRIAL RATE: 81 BPM
CALCULATED P AXIS, ECG09: 27 DEGREES
CALCULATED R AXIS, ECG10: 30 DEGREES
CALCULATED T AXIS, ECG11: 24 DEGREES
DIAGNOSIS, 93000: NORMAL
P-R INTERVAL, ECG05: 162 MS
Q-T INTERVAL, ECG07: 396 MS
QRS DURATION, ECG06: 86 MS
QTC CALCULATION (BEZET), ECG08: 460 MS
VENTRICULAR RATE, ECG03: 81 BPM

## 2020-08-24 LAB
WNV IGG SER QL IA: NEGATIVE
WNV IGM SER QL IA: NEGATIVE

## 2020-08-25 LAB
BACTERIA SPEC CULT: NORMAL
SERVICE CMNT-IMP: NORMAL

## 2020-09-04 ENCOUNTER — TELEPHONE (OUTPATIENT)
Dept: INTERNAL MEDICINE CLINIC | Age: 55
End: 2020-09-04

## 2020-09-04 NOTE — TELEPHONE ENCOUNTER
----- Message from Frankjena Crooks sent at 9/4/2020  4:00 PM EDT -----  Regarding: /Telephone  General Message/Vendor Calls    Caller's first and last name:Pt       Reason for call:Np appointment      Callback required yes/no and why:Yes      Best contact number(s):[phone]      Details to clarify the request:Pt requesting a call back regarding np appointment due to fever , lack of appetite and chills. Pt stated he was tested twice for cov-19 and was negative. Pt was adamant about sending message to establish care. Pt stated he have an upcoming appointment with an infection disease dr on 09/09/2020 and need a pcp to follow up with after appointment.       Jessica Hernandez

## 2020-09-09 ENCOUNTER — OFFICE VISIT (OUTPATIENT)
Dept: INTERNAL MEDICINE CLINIC | Age: 55
End: 2020-09-09
Payer: COMMERCIAL

## 2020-09-09 VITALS
RESPIRATION RATE: 18 BRPM | SYSTOLIC BLOOD PRESSURE: 118 MMHG | HEART RATE: 98 BPM | WEIGHT: 208 LBS | TEMPERATURE: 98.1 F | HEIGHT: 73 IN | DIASTOLIC BLOOD PRESSURE: 72 MMHG | BODY MASS INDEX: 27.57 KG/M2 | OXYGEN SATURATION: 97 %

## 2020-09-09 DIAGNOSIS — R50.9 FEVER, UNSPECIFIED FEVER CAUSE: Primary | ICD-10-CM

## 2020-09-09 DIAGNOSIS — R50.9 FEVER, UNSPECIFIED FEVER CAUSE: ICD-10-CM

## 2020-09-09 PROCEDURE — 99205 OFFICE O/P NEW HI 60 MIN: CPT | Performed by: INTERNAL MEDICINE

## 2020-09-09 RX ORDER — OLMESARTAN MEDOXOMIL 20 MG/1
20 TABLET ORAL DAILY
COMMUNITY
Start: 2020-08-17 | End: 2021-02-10 | Stop reason: SDUPTHER

## 2020-09-09 NOTE — Clinical Note
Can you pls find out who reffered this patient to me and send them a copy of my note from 9/9/20  Thanks

## 2020-09-09 NOTE — PROGRESS NOTES
Infectious Disease Consult Note    Reason for Consult: fever  Date of Consultation: September 9, 2020        HPI:  Karine Alvarado is a 63y.o. year old male with history of DVT who presents with ongoing fevers since August 2020. He reports getting chills shakes and fatigue. Reports fever with a T-max of 103.7 3-4 times since August.  Says he takes Tylenol or Motrin but does not help. Reports having had neck pain prior to symptoms for which he saw a chiropractor and since then the neck pain is resolved. Admits to head rush without any pain. Admits to being constipated. Admits to having dyspnea on exertion had some feet pain. He denies any myalgias or rash. He says his appetite has gotten better in the last 3 to 4 weeks but he has had some weight loss. Reports he used to be 230 pounds before his symptoms started and is 208 pounds today. He denies any genital discharge or lumps or bumps. He says that the fevers are mostly mostly at nights. He cannot think of any aggravating or alleviating factors. Denies any chest pain or visual symptoms, sore throat or ulcers in his mouth or genital area. He denies any nausea vomiting or dysuria. Denies any back pain or focal joint pain. Denies any neurological symptoms other than just feeling a \" head rush\" when he gets up too soon. He denies any sick contacts    In regards to epidemiological history, he has been exposed to chickens and has cleaned coup. He used to have goats 3 to 4 years ago but has not come in contact with goats sheep or other farm animals recently. He has 3 dogs that are vaccinated. Used to go to a "[employer]in and swim [place]. Also reports hiking with these events have been after his fever started. The only symptom his wife says that she could think of is he had a lot of crabs before the fever started in August.. They do go to a cheese shop but they are not sure if the cheeses unpasteurized there.   He works for [employer] and is out on the [employer] but not around too many people. He denies any new medications. Says he has been on doxycycline for a course since his fever started in August for about 10 days    Past Medical History:  DVT    Surgical History:  Knee surgery denies any hardware    Family History:   Heart disease, diabetes, Parkinson's    Social History:     · Living Situation: At home with his wife, has children were healthy, works with [employer]  · Tobacco: Denied  · Alcohol: Denied  · Illicit Drugs: Denied  · Sexual History: One partner/his wife denies a history of STI,  · Travel History denied  · Exposures: Chickens, dogs, [place] but these exposures have been after the fever started      Allergies: Allergies   Allergen Reactions    Lidocaine Other (comments)     Passes out    Other Medication Nausea Only     Antibiotics make him nauseated - can't remember which ones         Review of Systems:    10 point review of systems obtained  Per HPI  All others negative    Medications:  No current outpatient medications on file prior to visit. No current facility-administered medications on file prior to visit. Physical Exam:    · Vitals: Reviewed GEN: NAD  · HEENT: Normocephalic, atraumatic, PERRL, no scleral icterus,  no cervical, no lymphadenopathy, no sinus tenderness, no thrush, dentition fair CV: S1, S2 heard regularly,   · Lungs: Clear to auscultation bilaterally  · Abdomen soft nondistended nontender no CVA tenderness   · Extremities: no edema  · Neuro: Alert, oriented to time, place and situation, moves all extremities to commands, verbal   · Skin: no rash  · Psych: good affect, good eye contact, non tearful   · Musculoskeletal no tenderness to palpation of his back, knees ankles      Labs      Component  Value  Flag  Ref Range  Units  Status    West Nile Virus Ab, IgG  Negative   Negative     Final    Comment:    (NOTE)   No detectable West Nile Virus IgG Antibody.  If a recent infection is   suspected, another specimen should be submitted for testing within   7-14 days. West Nile Virus Ab, IgM  Negative   Negative     Final    Comment:      Component  Value  Flag  Ref Range  Units  Status    CK  51          Lipase  294      Component  Value  Flag  Ref Range  Units  Status    Sodium  136   136 - 145  mmol/L  Final    Potassium  4.7   3.5 - 5.1  mmol/L  Final    Chloride  105   97 - 108  mmol/L  Final    CO2  25   21 - 32  mmol/L  Final    Anion gap  6   5 - 15  mmol/L  Final    Glucose  102  High    65 - 100  mg/dL  Final    BUN  18   6 - 20  MG/DL  Final    Creatinine  1.17   0.70 - 1.30  MG/DL  Final    BUN/Creatinine ratio  15   12 - 20     Final    GFR est AA  >60   >60  ml/min/1.73m2  Final    GFR est non-AA  >60   >60  ml/min/1.73m2  Final    Comment:    Estimated GFR is calculated using the IDMS-traceable Modification of Diet in Renal Disease (MDRD) Study equation, reported for both  Americans (GFRAA) and non- Americans (GFRNA), and normalized to 1.73m2 body surface area. The physician must decide which value applies to the patient. The MDRD study equation should only be used in individuals age 25 or older. It has not been validated for the following: pregnant women, patients with serious comorbid conditions, or on certain medications, or persons with extremes of body size, muscle mass, or nutritional status. Calcium  9.8   8.5 - 10.1  MG/DL  Final    Bilirubin, total  0.5   0.2 - 1.0  MG/DL  Final    ALT (SGPT)  63   12 - 78  U/L  Final    AST (SGOT)  20   15 - 37  U/L  Final    Alk.  phosphatase  77   45 - 117  U/L  Final    Protein, total  8.7  High    6.4 - 8.2  g/dL  Final    Albumin  3.4  Low    3.5 - 5.0  g/dL  Final    Globulin  5.3  High    2.0 - 4.0  g/dL  Final    A-G Ratio  0.6          Component  Value  Flag  Ref Range  Units  Status    WBC  10.7   4.1 - 11.1  K/uL  Final    RBC  4.76   4.10 - 5.70  M/uL  Final    HGB  12.5   12.1 - 17.0  g/dL  Final    HCT  39.6   36.6 - 50.3  % Final    MCV  83.2   80.0 - 99.0  FL  Final    MCH  26.3   26.0 - 34.0  PG  Final    MCHC  31.6   30.0 - 36.5  g/dL  Final    RDW  13.2   11.5 - 14.5  %  Final    PLATELET  783  High    150 - 400  K/uL  Final    MPV  8.7  Low    8.9 - 12.9  FL  Final    NRBC  0.0   0   WBC  Final    ABSOLUTE NRBC  0.00   0.00 - 0.01  K/uL  Final    NEUTROPHILS  74   32 - 75  %  Final    LYMPHOCYTES  13   12 - 49  %  Final    MONOCYTES  11   5 - 13  %  Final    EOSINOPHILS  1   0 - 7  %  Final    BASOPHILS  0   0 - 1  %  Final    IMMATURE GRANULOCYTES  1  High    0.0 - 0.5  %  Final    ABS. NEUTROPHILS  8.0   1.8 - 8.0  K/UL  Final    ABS. LYMPHOCYTES  1.4   0.8 - 3.5  K/UL  Final    ABS. MONOCYTES  1.1  High    0.0 - 1.0  K/UL  Final    ABS. EOSINOPHILS  0.1   0.0 - 0.4  K/UL  Final    ABS. BASOPHILS  0.0   0.0 - 0.1  K/UL  Final    ABS. IMM.  GRANS.  0.1  High    0.00 - 0.04  K/UL  Final    DF  AUTOMATED          Component  Value  Flag  Ref Range  Units  Status    R. rickettsii IgM  0.22          Component  Value  Flag  Ref Range  Units  Status    Lyme Ab, IgG/IgM  <0.91   0.00 - 0.90  ISR  Final    Comment:      COVID-19  Not Detected   Not Detected     Final    Comment:          Microbiology Data:       Blood: 8/25/20  Component  Value  Ref Range & Units  Status    Special Requests:  NO SPECIAL REQUESTS     Final    Culture result:  NO GROWTH 5 DAYS     Final    Result History       Component  Value  Flag  Ref Range  Units  Status    Color  YELLOW/STRAW       Final    Comment:    Color Reference Range: Straw, Yellow or Dark Yellow    Appearance  CLEAR   CLEAR     Final    Specific gravity  1.025   1.003 - 1.030     Final    pH (UA)  5.5   5.0 - 8.0     Final    Protein  Negative   NEG  mg/dL  Final    Glucose  Negative   NEG  mg/dL  Final    Ketone  Negative   NEG  mg/dL  Final    Bilirubin  Negative   NEG     Final    Blood  Negative   NEG     Final    Urobilinogen  0.2   0.2 - 1.0  EU/dL  Final    Nitrites  Negative   NEG    Final    Leukocyte Esterase  Negative   NEG     Final    WBC  0-4   0 - 4  /hpf  Final    RBC  0-5   0 - 5  /hpf  Final    Epithelial cells  FEW   FEW  /lpf  Final    Comment:    Epithelial cell category consists of squamous cells and /or transitional urothelial cells. Renal tubular cells, if present, are separately identified as such. Bacteria  Negative   NEG  /hpf  Final    UA:UC IF INDICATED    CNI     Final    CULTURE NOT INDICATED BY UA RESULT    Hyaline cast  0-2                   Pathology Results:    Imaging:   CXR 8/20/20  FINDINGS: A portable AP radiograph of the chest was obtained at 1346 hours. There is no pneumothorax or pleural effusion. The lungs are clear. Cardiac,  mediastinal and hilar contours are normal.  The bones and soft tissues are  grossly within normal limits.      IMPRESSION  IMPRESSION: No acute findings      Assessment / Plan:      Holland Chu is a 63y.o. year old male with history of DVT who presents with ongoing fevers since August 2020.    1) fever of unknown origin  Differentials include infectious, noninfectious such as autoimmune, rheumatological, hematological, malignancy  Discussed with patient that we will start an infectious work-up for immune compromising status as well as atypical infections    Plan  Check CBC with differential, CMP  HIV, RPR, hepatitis panel.   Consent obtained verbally from patient  Check fungal serologies, fungal blood culture/blood culture  Check CT chest abdomen pelvis to assess for lymphadenopathy and possible biopsy based on findings  He is status post doxycycline which should typically treat tickborne illnesses  Additionally hiking and his outside exposure took place after the fever started and doubt the cause  Given exposure to chicken we will also check for endemic fungal histo, crypto, other atypical infections  Check for Bartonella, Brucella as well as Q fever serologies  Check EBV and CMV PCR  Discussed that DVTs can also potentially cause fevers and if the above work-up is negative, may need to assess for DVTs but given chills and other systemic symptoms concern for an occult infection  Follow-up with primary care regarding duplex to assess for DVT    2) history of DVT     3) knee surgery history  Patient denies history of hardware  Asymptomatic focally    Follow-up in 3 to 4 weeks    If worsening symptoms, counseled the patient to go to the emergency room    Thank for the opportunity to participate in the care of this patient. Please contact with questions or concerns.

## 2020-09-09 NOTE — PROGRESS NOTES
Room 2    Identified pt with two pt identifiers(name and ). Reviewed record in preparation for visit and have obtained necessary documentation. All patient medications has been reviewed. Chief Complaint   Patient presents with   Renetta Goddard [facility]     x 2 months. Loss of appetite. Dysphagia.  Fever     fever spikes at night. COVID test completed 08/10: neg.  Fatigue    Constipation    Breathing Problem     3 most recent PHQ Screens 2020   Little interest or pleasure in doing things Not at all   Feeling down, depressed, irritable, or hopeless Not at all   Total Score PHQ 2 0     Abuse Screening Questionnaire 2020   Do you ever feel afraid of your partner? N   Are you in a relationship with someone who physically or mentally threatens you? N   Is it safe for you to go home? Y       Vitals:    20 0958   BP: 118/72   Pulse: 98   Resp: 18   Temp: 98.1 °F (36.7 °C)   TempSrc: Oral   SpO2: 97%   Weight: 208 lb (94.3 kg)   Height: 6' 1\" (1.854 m)   PainSc:   0 - No pain       Wt Readings from Last 3 Encounters:   20 208 lb (94.3 kg)   20 208 lb 12.4 oz (94.7 kg)   08/10/20 214 lb 4.6 oz (97.2 kg)     Temp Readings from Last 3 Encounters:   20 98.1 °F (36.7 °C) (Oral)   20 98.3 °F (36.8 °C)   08/10/20 99 °F (37.2 °C)     BP Readings from Last 3 Encounters:   20 118/72   20 119/77   08/10/20 121/80     Pulse Readings from Last 3 Encounters:   20 98   20 80   08/10/20 81       No results found for: HBA1C, HGBE8, FQG3ULMB, RNK8ICGB, YBZ0MIIT    Coordination of Care Questionnaire:   1) Have you been to an emergency room, urgent care, or hospitalized since your last visit? yes    08/10/2020  Diagnosis  Comment    Fever, unspecified fever cause     Decreased appetite     Malaise and fatigue      2020   Diagnosis  Comment    Fever, unspecified fever cause     Atypical chest pain     Fatigue, unspecified type         2.  Have seen or consulted any other health care provider since your last visit? NO    3) Do you have an Advanced Directive/ Living Will in place? NO  If yes, do we have a copy on file NO  If no, would you like information NO    Patient is accompanied by wife I have received verbal consent from Mally Jefferson to discuss any/all medical information while they are present in the room.

## 2020-09-10 NOTE — TELEPHONE ENCOUNTER
I return call to pt. 2 pt identifiers verified. Pt states that he has found an internal medicine doctor, that if things do not work out with that doctor, he will call our office back. Pt had no further questions or need any further assistance.

## 2020-09-15 LAB
BACTERIA BLD CULT: NORMAL
GAMMA INTERFERON BACKGROUND BLD IA-ACNC: 0.01 IU/ML
M TB IFN-G BLD-IMP: NEGATIVE
M TB IFN-G CD4+ BCKGRND COR BLD-ACNC: 0.01 IU/ML
MITOGEN IGNF BLD-ACNC: 3.15 IU/ML
QUANTIFERON INCUBATION, QF1T: NORMAL
QUANTIFERON TB2 AG: 0.01 IU/ML
SERVICE CMNT-IMP: NORMAL

## 2020-09-21 ENCOUNTER — DOCUMENTATION ONLY (OUTPATIENT)
Dept: INTERNAL MEDICINE CLINIC | Age: 55
End: 2020-09-21

## 2020-09-21 ENCOUNTER — TELEPHONE (OUTPATIENT)
Dept: INTERNAL MEDICINE CLINIC | Age: 55
End: 2020-09-21

## 2020-09-21 DIAGNOSIS — R50.9 FEVER OF UNKNOWN ORIGIN: Primary | ICD-10-CM

## 2020-09-21 LAB
A FLAVUS AB SER QL ID: NEGATIVE
A FUMIGATUS AB SER QL ID: NEGATIVE
A NIGER AB SER QL ID: NEGATIVE
ALBUMIN SERPL-MCNC: 4.3 G/DL (ref 3.8–4.9)
ALBUMIN/GLOB SERPL: 1.2 {RATIO} (ref 1.2–2.2)
ALP SERPL-CCNC: 71 IU/L (ref 39–117)
ALT SERPL-CCNC: 32 IU/L (ref 0–44)
AST SERPL-CCNC: 20 IU/L (ref 0–40)
B DERMAT AB TITR SER: NEGATIVE {TITER}
B HENSELAE IGG TITR SER IF: NEGATIVE TITER
B HENSELAE IGM TITR SER IF: NEGATIVE TITER
B QUINTANA IGG TITR SER IF: NEGATIVE TITER
B QUINTANA IGM TITR SER IF: NEGATIVE TITER
BASOPHILS # BLD AUTO: 0.1 X10E3/UL (ref 0–0.2)
BASOPHILS NFR BLD AUTO: 0 %
BILIRUB SERPL-MCNC: 0.3 MG/DL (ref 0–1.2)
BRUCELLA IGG SER QL IA: NEGATIVE
BRUCELLA IGM SER QL IA: NORMAL
BUN SERPL-MCNC: 22 MG/DL (ref 6–24)
BUN/CREAT SERPL: 23 (ref 9–20)
C BURNET PH1 IGG SER QL IF: NEGATIVE
C BURNET PH2 IGG SER QL IF: NEGATIVE
CALCIUM SERPL-MCNC: 9.4 MG/DL (ref 8.7–10.2)
CD3+CD4+ CELLS # BLD: 669 /UL (ref 359–1519)
CD3+CD4+ CELLS NFR BLD: 41.8 % (ref 30.8–58.5)
CHLORIDE SERPL-SCNC: 101 MMOL/L (ref 96–106)
CMV DNA SERPL NAA+PROBE-ACNC: NEGATIVE IU/ML
CMV DNA SERPL NAA+PROBE-LOG IU: NORMAL LOG10 IU/ML
CO2 SERPL-SCNC: 22 MMOL/L (ref 20–29)
CREAT SERPL-MCNC: 0.94 MG/DL (ref 0.76–1.27)
CRP SERPL-MCNC: 154 MG/L (ref 0–10)
CRYPTOC AG SER QL IA.RAPID: NEGATIVE
EBV NA IGG SER IA-ACNC: 107 U/ML (ref 0–17.9)
EBV VCA IGG SER IA-ACNC: >600 U/ML (ref 0–17.9)
EBV VCA IGM SER IA-ACNC: <36 U/ML (ref 0–35.9)
EOSINOPHIL # BLD AUTO: 0.1 X10E3/UL (ref 0–0.4)
EOSINOPHIL NFR BLD AUTO: 1 %
ERYTHROCYTE [DISTWIDTH] IN BLOOD BY AUTOMATED COUNT: 12.6 % (ref 11.6–15.4)
ERYTHROCYTE [SEDIMENTATION RATE] IN BLOOD BY WESTERGREN METHOD: 113 MM/HR (ref 0–30)
FUNGUS SPEC CULT: NORMAL
GLOBULIN SER CALC-MCNC: 3.5 G/DL (ref 1.5–4.5)
GLUCOSE SERPL-MCNC: 95 MG/DL (ref 65–99)
H CAPSUL AB TITR SER ID: NEGATIVE {TITER}
H CAPSUL AG UR QL IA: <0.5
HAV IGM SERPL QL IA: NEGATIVE
HBV CORE IGM SERPL QL IA: NEGATIVE
HBV SURFACE AG SERPL QL IA: NEGATIVE
HCT VFR BLD AUTO: 35.9 % (ref 37.5–51)
HCV AB S/CO SERPL IA: <0.1 S/CO RATIO (ref 0–0.9)
HGB BLD-MCNC: 11.4 G/DL (ref 13–17.7)
HIV 1+2 AB+HIV1 P24 AG SERPL QL IA: NON REACTIVE
HIV1 RNA # SERPL NAA+PROBE: <20 COPIES/ML
HIV1 RNA SERPL NAA+PROBE-LOG#: NORMAL LOG10COPY/ML
IMM GRANULOCYTES # BLD AUTO: 0.1 X10E3/UL (ref 0–0.1)
IMM GRANULOCYTES NFR BLD AUTO: 1 %
LYMPHOCYTES # BLD AUTO: 1.6 X10E3/UL (ref 0.7–3.1)
LYMPHOCYTES NFR BLD AUTO: 10 %
Lab: NORMAL
MCH RBC QN AUTO: 25.8 PG (ref 26.6–33)
MCHC RBC AUTO-ENTMCNC: 31.8 G/DL (ref 31.5–35.7)
MCV RBC AUTO: 81 FL (ref 79–97)
MONOCYTES # BLD AUTO: 1.3 X10E3/UL (ref 0.1–0.9)
MONOCYTES NFR BLD AUTO: 9 %
NEUTROPHILS # BLD AUTO: 12.2 X10E3/UL (ref 1.4–7)
NEUTROPHILS NFR BLD AUTO: 79 %
PLATELET # BLD AUTO: 499 X10E3/UL (ref 150–450)
POTASSIUM SERPL-SCNC: 4.4 MMOL/L (ref 3.5–5.2)
PROT SERPL-MCNC: 7.8 G/DL (ref 6–8.5)
RBC # BLD AUTO: 4.42 X10E6/UL (ref 4.14–5.8)
RPR SER QL: NON REACTIVE
SERVICE CMNT-IMP: ABNORMAL
SODIUM SERPL-SCNC: 138 MMOL/L (ref 134–144)
WBC # BLD AUTO: 15.3 X10E3/UL (ref 3.4–10.8)

## 2020-09-21 NOTE — PROGRESS NOTES
Spoke to Mr Andrea Chapa   Discussed test results  He says his fever curve is improved and does not have night sweats as before   His CT pending   Inflammatory markers inflamed and will also give referral to rheumatology as he says he has had some more than usual joint aches and pain     Temitope Grande, DO  1:04 PM

## 2020-09-21 NOTE — TELEPHONE ENCOUNTER
Patient called to get the results of his labs. please call him back at [phone] or call his wife sharif at [phone]

## 2020-09-29 NOTE — TELEPHONE ENCOUNTER
Called Mr Ash Vale to inform CT scan results but went to . Left a message to call back. CT Chest, A/P   No lymphadenopathy, no definite finding to explain fever. Subcentimeter right renal low density lesion, too small to characterize    F/U wt pcp about renal lesion and ??  Renal/urology going forward, repeat CT to follow in future     If fevers persist, need to see rheumatology    Temitope Grande,   10:45 AM

## 2020-09-30 ENCOUNTER — OFFICE VISIT (OUTPATIENT)
Dept: INTERNAL MEDICINE CLINIC | Age: 55
End: 2020-09-30
Payer: COMMERCIAL

## 2020-09-30 VITALS
BODY MASS INDEX: 27.17 KG/M2 | WEIGHT: 205 LBS | HEIGHT: 73 IN | TEMPERATURE: 97.8 F | OXYGEN SATURATION: 97 % | SYSTOLIC BLOOD PRESSURE: 110 MMHG | DIASTOLIC BLOOD PRESSURE: 80 MMHG | HEART RATE: 77 BPM

## 2020-09-30 DIAGNOSIS — Z86.718 HISTORY OF DEEP VEIN THROMBOSIS (DVT) OF LOWER EXTREMITY: ICD-10-CM

## 2020-09-30 DIAGNOSIS — R50.9 FEVER, UNSPECIFIED FEVER CAUSE: Primary | ICD-10-CM

## 2020-09-30 DIAGNOSIS — R50.9 FEVER OF UNKNOWN ORIGIN: Primary | ICD-10-CM

## 2020-09-30 PROCEDURE — 99214 OFFICE O/P EST MOD 30 MIN: CPT | Performed by: INTERNAL MEDICINE

## 2020-09-30 NOTE — PROGRESS NOTES
Chief Complaint   Patient presents with    Results     CT scan         1. Have you been to the ER, urgent care clinic since your last visit? Hospitalized since your last visit? no    2. Have you seen or consulted any other health care providers outside of the [address] [place] [address] since your last visit? Include any pap smears or colon screening.   no

## 2020-10-03 NOTE — PROGRESS NOTES
Infectious Disease Clinic Note        HPI:    Mr Reena Fernández came with his wife today . He says fevers and chills much better  Energy levels improving  Says temp now only around 99 and at nights (7-8 pm)  Otherwise asymptomatic   No other symptoms of cough, rash, vomiting, diarrhea, dysuria, genital discharge, ulcers  Eating well           Allergies: Allergies   Allergen Reactions    Lidocaine Other (comments)     Passes out    Other Medication Nausea Only     Antibiotics make him nauseated - can't remember which ones         Review of Systems:    10 point review of systems obtained  Per HPI  All others negative    Medications:  Current Outpatient Medications on File Prior to Visit   Medication Sig Dispense Refill    olmesartan (BENICAR) 20 mg tablet Take 20 mg by mouth daily.  cyanocobalamin, vitamin B-12, (VITAMIN B-12 PO) Take  by mouth daily. No current facility-administered medications on file prior to visit.             Physical Exam:    · Vitals: Reviewed   · GEN: NAD  · HEENT: Normocephalic, atraumatic, PERRL, no scleral icterus,  no cervical, no lymphadenopathy, no sinus tenderness, no thrush, dentition fair  ·  CV: S1, S2 heard regularly,   · Lungs: Clear to auscultation bilaterally  · Abdomen soft nondistended nontender no CVA tenderness   · Extremities: no edema  · Neuro: Alert, oriented to time, place and situation, moves all extremities to commands, verbal   · Skin: no rash  · Psych: good affect, good eye contact, non tearful   · Musculoskeletal no tenderness to palpation of his back, knees ankles      Labs  9/9/20  CMV PCR negative  HIV NR, VL < 20, CD4 669/41.8  Cryptococcus ag negative  Fungus serologies negative  Bartonella IGG and IGM negative  Coxiella IGG negative   EBV IGM < 36, IGG VCA and EBNA +   RPR NR  Hepatitis acute panel negative      Quanteferon plus negative  Blood cx negative  Fungal blood cx final pending, so far negative     WBC  15.3  High    3.4 - 10.8 x10E3/uL  Final    RBC  4.42   4.14 - 5.80  x10E6/uL  Final    HGB  11.4  Low    13.0 - 17.7  g/dL  Final    HCT  35.9  Low    37.5 - 51.0  %  Final    MCV  81   79 - 97  fL  Final    MCH  25.8  Low    26.6 - 33.0  pg  Final    MCHC  31.8   31.5 - 35.7  g/dL  Final    RDW  12.6   11.6 - 15.4  %  Final    PLATELET  318  High    150 - 450  x10E3/uL  Final       Glucose  95   65 - 99  mg/dL  Final    BUN  22   6 - 24  mg/dL  Final    Creatinine  0.94   0.76 - 1.27  mg/dL  Final    GFR est non-AA  90   >59  mL/min/1.73  Final    GFR est AA  104   >59  mL/min/1.73  Final    BUN/Creatinine ratio  23  High    9 - 20   Final    Sodium  138   134 - 144  mmol/L  Final    Potassium  4.4   3.5 - 5.2  mmol/L  Final    Chloride  101   96 - 106  mmol/L  Final    CO2  22   20 - 29  mmol/L  Final    Calcium  9.4   8.7 - 10.2  mg/dL  Final    Protein, total  7.8   6.0 - 8.5  g/dL  Final    Albumin  4.3   3.8 - 4.9  g/dL  Final    GLOBULIN, TOTAL  3.5   1.5 - 4.5  g/dL  Final    A-G Ratio  1.2   1.2 - 2.2   Final    Bilirubin, total  0.3   0.0 - 1.2  mg/dL  Final    Alk. phosphatase  71   39 - 117  IU/L  Final    AST (SGOT)  20   0 - 40  IU/L  Final    ALT (SGPT)  32            Component  Value  Flag  Ref Range  Units  Status    West Nile Virus Ab, IgG  Negative   Negative     Final    Comment:    (NOTE)   No detectable West Nile Virus IgG Antibody. If a recent infection is   suspected, another specimen should be submitted for testing within   7-14 days.     West Nile Virus Ab, IgM  Negative   Negative     Final    Comment:      Component  Value  Flag  Ref Range  Units  Status    CK  51          Lipase  294      Component  Value  Flag  Ref Range  Units  Status    Sodium  136   136 - 145  mmol/L  Final    Potassium  4.7   3.5 - 5.1  mmol/L  Final    Chloride  105   97 - 108  mmol/L  Final    CO2  25   21 - 32  mmol/L  Final    Anion gap  6   5 - 15  mmol/L  Final    Glucose  102  High    65 - 100  mg/dL  Final    BUN  18   6 - 20  MG/DL Final    Creatinine  1.17   0.70 - 1.30  MG/DL  Final    BUN/Creatinine ratio  15   12 - 20     Final    GFR est AA  >60   >60  ml/min/1.73m2  Final    GFR est non-AA  >60   >60  ml/min/1.73m2  Final    Comment:    Estimated GFR is calculated using the IDMS-traceable Modification of Diet in Renal Disease (MDRD) Study equation, reported for both  Americans (GFRAA) and non- Americans (GFRNA), and normalized to 1.73m2 body surface area. The physician must decide which value applies to the patient. The MDRD study equation should only be used in individuals age 25 or older. It has not been validated for the following: pregnant women, patients with serious comorbid conditions, or on certain medications, or persons with extremes of body size, muscle mass, or nutritional status. Calcium  9.8   8.5 - 10.1  MG/DL  Final    Bilirubin, total  0.5   0.2 - 1.0  MG/DL  Final    ALT (SGPT)  63   12 - 78  U/L  Final    AST (SGOT)  20   15 - 37  U/L  Final    Alk.  phosphatase  77   45 - 117  U/L  Final    Protein, total  8.7  High    6.4 - 8.2  g/dL  Final    Albumin  3.4  Low    3.5 - 5.0  g/dL  Final    Globulin  5.3  High    2.0 - 4.0  g/dL  Final    A-G Ratio  0.6          Component  Value  Flag  Ref Range  Units  Status    WBC  10.7   4.1 - 11.1  K/uL  Final    RBC  4.76   4.10 - 5.70  M/uL  Final    HGB  12.5   12.1 - 17.0  g/dL  Final    HCT  39.6   36.6 - 50.3  %  Final    MCV  83.2   80.0 - 99.0  FL  Final    MCH  26.3   26.0 - 34.0  PG  Final    MCHC  31.6   30.0 - 36.5  g/dL  Final    RDW  13.2   11.5 - 14.5  %  Final    PLATELET  769  High    150 - 400  K/uL  Final    MPV  8.7  Low    8.9 - 12.9  FL  Final    NRBC  0.0   0   WBC  Final    ABSOLUTE NRBC  0.00   0.00 - 0.01  K/uL  Final    NEUTROPHILS  74   32 - 75  %  Final    LYMPHOCYTES  13   12 - 49  %  Final    MONOCYTES  11   5 - 13  %  Final    EOSINOPHILS  1   0 - 7  %  Final    BASOPHILS  0   0 - 1  %  Final    IMMATURE GRANULOCYTES  1 High    0.0 - 0.5  %  Final    ABS. NEUTROPHILS  8.0   1.8 - 8.0  K/UL  Final    ABS. LYMPHOCYTES  1.4   0.8 - 3.5  K/UL  Final    ABS. MONOCYTES  1.1  High    0.0 - 1.0  K/UL  Final    ABS. EOSINOPHILS  0.1   0.0 - 0.4  K/UL  Final    ABS. BASOPHILS  0.0   0.0 - 0.1  K/UL  Final    ABS. IMM. GRANS.  0.1  High    0.00 - 0.04  K/UL  Final    DF  AUTOMATED          Component  Value  Flag  Ref Range  Units  Status    R. rickettsii IgM  0.22          Component  Value  Flag  Ref Range  Units  Status    Lyme Ab, IgG/IgM  <0.91   0.00 - 0.90  ISR  Final    Comment:      COVID-19  Not Detected   Not Detected     Final    Comment:          Microbiology Data:       Blood: 8/25/20  Component  Value  Ref Range & Units  Status    Special Requests:  NO SPECIAL REQUESTS     Final    Culture result:  NO GROWTH 5 DAYS     Final    Result History       Component  Value  Flag  Ref Range  Units  Status    Color  YELLOW/STRAW       Final    Comment:    Color Reference Range: Straw, Yellow or Dark Yellow    Appearance  CLEAR   CLEAR     Final    Specific gravity  1.025   1.003 - 1.030     Final    pH (UA)  5.5   5.0 - 8.0     Final    Protein  Negative   NEG  mg/dL  Final    Glucose  Negative   NEG  mg/dL  Final    Ketone  Negative   NEG  mg/dL  Final    Bilirubin  Negative   NEG     Final    Blood  Negative   NEG     Final    Urobilinogen  0.2   0.2 - 1.0  EU/dL  Final    Nitrites  Negative   NEG     Final    Leukocyte Esterase  Negative   NEG     Final    WBC  0-4   0 - 4  /hpf  Final    RBC  0-5   0 - 5  /hpf  Final    Epithelial cells  FEW   FEW  /lpf  Final    Comment:    Epithelial cell category consists of squamous cells and /or transitional urothelial cells. Renal tubular cells, if present, are separately identified as such.     Bacteria  Negative   NEG  /hpf  Final    UA:UC IF INDICATED    CNI     Final    CULTURE NOT INDICATED BY UA RESULT    Hyaline cast  0-2                   Pathology Results:    Imaging:   CXR 8/20/20  FINDINGS: A portable AP radiograph of the chest was obtained at 1346 hours. There is no pneumothorax or pleural effusion. The lungs are clear.  Cardiac,  mediastinal and hilar contours are normal.  The bones and soft tissues are  grossly within normal limits.      IMPRESSION  IMPRESSION: No acute findings      Assessment / Plan:      Lucrecia Isaac is a 63y.o. year old male with history of DVT who presents with ongoing fevers since August 2020.    1) fever of unknown origin  So far infectious workup negative  Improving clinically   Other  noninfectious such as autoimmune ( CRP/ESR elevated) , rheumatological, hematological, malignancy also on differential and discussed with patient  No LAD on CT C/A/P  Small renal lesion but too small   D/W patient to follow up wt rheum, repeat CBC and if WBC still elevated, hematology consult, smear etc and further workup  He has had exposure to chicken but fungal workup , CT chest negative  Asked to avoid exposure for now    No clinical signs of DVT but told to follow up with primary care       2) history of DVT     3) knee surgery history  Patient denies history of hardware  Asymptomatic focally

## 2020-10-13 ENCOUNTER — OFFICE VISIT (OUTPATIENT)
Dept: INTERNAL MEDICINE CLINIC | Age: 55
End: 2020-10-13
Payer: COMMERCIAL

## 2020-10-13 VITALS
RESPIRATION RATE: 18 BRPM | HEIGHT: 73 IN | TEMPERATURE: 98 F | WEIGHT: 211 LBS | HEART RATE: 85 BPM | SYSTOLIC BLOOD PRESSURE: 124 MMHG | DIASTOLIC BLOOD PRESSURE: 88 MMHG | BODY MASS INDEX: 27.96 KG/M2 | OXYGEN SATURATION: 97 %

## 2020-10-13 DIAGNOSIS — M25.50 ARTHRALGIA, UNSPECIFIED JOINT: ICD-10-CM

## 2020-10-13 DIAGNOSIS — R50.9 INTERMITTENT FUO: Primary | ICD-10-CM

## 2020-10-13 DIAGNOSIS — I10 ESSENTIAL HYPERTENSION: ICD-10-CM

## 2020-10-13 DIAGNOSIS — R53.83 FATIGUE, UNSPECIFIED TYPE: ICD-10-CM

## 2020-10-13 DIAGNOSIS — R79.89 ABNORMAL CBC: ICD-10-CM

## 2020-10-13 DIAGNOSIS — R70.0 ESR RAISED: ICD-10-CM

## 2020-10-13 PROCEDURE — 99204 OFFICE O/P NEW MOD 45 MIN: CPT | Performed by: INTERNAL MEDICINE

## 2020-10-13 NOTE — PROGRESS NOTES
Health Maintenance Due   Topic Date Due    DTaP/Tdap/Td series (1 - Tdap) 02/13/1985    Lipid Screen  02/13/2004    Shingrix Vaccine Age 50> (1 of 2) 02/13/2014    FOBT Q1Y Age 50-75  02/13/2014       Chief Complaint   Patient presents with    New Patient     establishing pcp    Fever     hot flashes    Fatigue       1. Have you been to the ER, urgent care clinic since your last visit? Hospitalized since your last visit? Yes When: Sept 2020 Where: [facility] Reason for visit: dehydration, fever    2. Have you seen or consulted any other health care providers outside of the [address] [place] [address] since your last visit? Include any pap smears or colon screening. No    3) Do you have an Advance Directive on file? no    4) Are you interested in receiving information on Advance Directives? NO      Patient is accompanied by self I have received verbal consent from Harlan Obando to discuss any/all medical information while they are present in the room.

## 2020-10-14 ENCOUNTER — TELEPHONE (OUTPATIENT)
Dept: INTERNAL MEDICINE CLINIC | Age: 55
End: 2020-10-14

## 2020-10-14 LAB
BASOPHILS # BLD AUTO: 0.1 X10E3/UL (ref 0–0.2)
BASOPHILS NFR BLD AUTO: 1 %
EOSINOPHIL # BLD AUTO: 0.2 X10E3/UL (ref 0–0.4)
EOSINOPHIL NFR BLD AUTO: 2 %
ERYTHROCYTE [DISTWIDTH] IN BLOOD BY AUTOMATED COUNT: 12.9 % (ref 11.6–15.4)
HCT VFR BLD AUTO: 34.8 % (ref 37.5–51)
HGB BLD-MCNC: 11.3 G/DL (ref 13–17.7)
IMM GRANULOCYTES # BLD AUTO: 0.1 X10E3/UL (ref 0–0.1)
IMM GRANULOCYTES NFR BLD AUTO: 1 %
LYMPHOCYTES # BLD AUTO: 1.9 X10E3/UL (ref 0.7–3.1)
LYMPHOCYTES NFR BLD AUTO: 18 %
MCH RBC QN AUTO: 25.2 PG (ref 26.6–33)
MCHC RBC AUTO-ENTMCNC: 32.5 G/DL (ref 31.5–35.7)
MCV RBC AUTO: 78 FL (ref 79–97)
MONOCYTES # BLD AUTO: 1.3 X10E3/UL (ref 0.1–0.9)
MONOCYTES NFR BLD AUTO: 13 %
NEUTROPHILS # BLD AUTO: 7 X10E3/UL (ref 1.4–7)
NEUTROPHILS NFR BLD AUTO: 65 %
PLATELET # BLD AUTO: 487 X10E3/UL (ref 150–450)
RBC # BLD AUTO: 4.49 X10E6/UL (ref 4.14–5.8)
WBC # BLD AUTO: 10.6 X10E3/UL (ref 3.4–10.8)

## 2020-10-19 NOTE — PROGRESS NOTES
HISTORY OF PRESENT ILLNESS  Leela Rosas is a 63 y.o. male. New patient who comes in for [facility]. Referred by Dr. Zenobia Fortune.  Patient was hospitalized in August with FUO and fatigue. I reviewed records in [facility]. He had extensive work-up and seen by different specialists. Sed rate and CRP were elevated otherwise all labs and imaging studies were normal.  Reports having lost a lot of weight. Recent CBC showed elevated WBC and sed rate again. Patient has been referred to rheumatologist and hematologist for further evaluation but has not done yet. Reports continued issues with occasional fevers and fatigue over 80% improved. Reports having some joint pains. Has history of HTN. Has been on Benicar for a long time. Taking precautions regarding COVID-19. No other acute problems today. Review of Systems   Constitutional: Positive for fever, malaise/fatigue and weight loss. HENT: Negative. Eyes: Negative. Negative for blurred vision. Respiratory: Negative. Negative for cough and shortness of breath. Cardiovascular: Negative. Negative for chest pain and leg swelling. Gastrointestinal: Negative. Negative for abdominal pain and heartburn. Genitourinary: Negative. Negative for dysuria. Musculoskeletal: Positive for joint pain. Negative for falls. Skin: Negative. Negative for rash. Neurological: Negative. Negative for dizziness, sensory change, focal weakness and headaches. Endo/Heme/Allergies: Negative. Negative for polydipsia. Does not bruise/bleed easily. Psychiatric/Behavioral: Negative. Negative for depression. The patient is not nervous/anxious and does not have insomnia. Physical Exam  Vitals signs and nursing note reviewed. Constitutional:       General: He is not in acute distress. Appearance: He is well-developed. Comments: Pleasant man   HENT:      Head: Normocephalic and atraumatic.       Nose: Nose normal.      Mouth/Throat: Mouth: Mucous membranes are moist.      Pharynx: Oropharynx is clear. Eyes:      General: No scleral icterus. Conjunctiva/sclera: Conjunctivae normal.      Pupils: Pupils are equal, round, and reactive to light. Neck:      Musculoskeletal: Normal range of motion and neck supple. Thyroid: No thyromegaly. Vascular: No JVD. Cardiovascular:      Rate and Rhythm: Normal rate and regular rhythm. Heart sounds: Normal heart sounds. No murmur. No friction rub. No gallop. Pulmonary:      Effort: Pulmonary effort is normal. No respiratory distress. Breath sounds: Normal breath sounds. No wheezing or rales. Abdominal:      General: Bowel sounds are normal. There is no distension. Palpations: Abdomen is soft. There is no mass. Tenderness: There is no abdominal tenderness. There is no guarding. Musculoskeletal:         General: No tenderness. Right lower leg: No edema. Left lower leg: No edema. Lymphadenopathy:      Cervical: No cervical adenopathy. Skin:     General: Skin is warm and dry. Findings: No erythema or rash. Neurological:      Mental Status: He is alert and oriented to person, place, and time. Cranial Nerves: No cranial nerve deficit. Coordination: Coordination normal.   Psychiatric:         Behavior: Behavior normal.         ASSESSMENT and PLAN  Diagnoses and all orders for this visit:    1. Intermittent FUO    2. Fatigue, unspecified type    3. Arthralgia, unspecified joint    4. Essential hypertension    5. ESR raised    6. Abnormal CBC      Follow-up and Dispositions    · Return in about 6 months (around 4/13/2021).      lab results and schedule of future lab studies reviewed with patient  reviewed diet, exercise and weight control  reviewed medications and side effects in detail  F/u with other MD's as scheduled  Advised patient to see rheumatologist as recommended ASAP  I told patient most likely we are dealing with an underlying rheumatological/connective tissue problem   reassurance that all infectious etiologies have been ruled out

## 2020-12-09 DIAGNOSIS — R50.9 FEVER, UNSPECIFIED FEVER CAUSE: ICD-10-CM

## 2021-02-10 RX ORDER — OLMESARTAN MEDOXOMIL 20 MG/1
20 TABLET ORAL DAILY
Qty: 90 TAB | Refills: 1 | Status: SHIPPED | OUTPATIENT
Start: 2021-02-10

## 2021-02-10 NOTE — TELEPHONE ENCOUNTER
Requested Prescriptions     Pending Prescriptions Disp Refills    olmesartan (BENICAR) 20 mg tablet        Sig: Take 1 Tab by mouth daily.      10/13/2020  No upcoming  [facility] on file

## 2021-08-12 ENCOUNTER — TELEPHONE (OUTPATIENT)
Dept: INTERNAL MEDICINE CLINIC | Age: 56
End: 2021-08-12

## 2021-08-12 NOTE — TELEPHONE ENCOUNTER
Pt having fever symptoms that he was having last year. Pt stated that the symptoms started to fade away over the last 6-8 months but he is starting to experience them again. Please advise.      Phone [phone]

## 2021-08-13 NOTE — TELEPHONE ENCOUNTER
Call placed to pt and spoke with him. He states that he is having the re currant fevers again. He had an appt with his PCP yesterday and they did more blood work and urine testing. He is asking what are his next steps?

## 2021-08-16 NOTE — TELEPHONE ENCOUNTER
Called and spoke with pt, advised that provider wants to wait for work up with PCP. Asked tat  Have that work up sent to provider here and then will f/u with if all of at work up is negative. Pt states he is a little better now. But not sure how long that will last with the on going fevers.

## 2021-08-30 ENCOUNTER — TRANSCRIBE ORDER (OUTPATIENT)
Dept: SCHEDULING | Age: 56
End: 2021-08-30

## 2021-08-30 DIAGNOSIS — R50.9 CHRONIC FEVER: ICD-10-CM

## 2021-08-30 DIAGNOSIS — R10.11 RUQ ABDOMINAL PAIN: Primary | ICD-10-CM

## 2021-09-07 ENCOUNTER — HOSPITAL ENCOUNTER (OUTPATIENT)
Dept: ULTRASOUND IMAGING | Age: 56
Discharge: HOME | End: 2021-09-07
Attending: NURSE PRACTITIONER
Payer: COMMERCIAL

## 2021-09-07 DIAGNOSIS — R10.11 RUQ ABDOMINAL PAIN: ICD-10-CM

## 2021-09-07 DIAGNOSIS — R50.9 CHRONIC FEVER: ICD-10-CM

## 2021-09-07 PROCEDURE — 76705 ECHO EXAM OF ABDOMEN: CPT

## 2022-03-19 PROBLEM — M25.50 ARTHRALGIA: Status: ACTIVE | Noted: 2020-10-13

## 2022-03-19 PROBLEM — R70.0 ESR RAISED: Status: ACTIVE | Noted: 2020-10-13

## 2022-03-19 PROBLEM — I10 ESSENTIAL HYPERTENSION: Status: ACTIVE | Noted: 2020-10-13

## 2023-05-11 RX ORDER — OLMESARTAN MEDOXOMIL 20 MG/1
TABLET ORAL DAILY
COMMUNITY
Start: 2021-02-10